# Patient Record
Sex: MALE | Race: WHITE | URBAN - METROPOLITAN AREA
[De-identification: names, ages, dates, MRNs, and addresses within clinical notes are randomized per-mention and may not be internally consistent; named-entity substitution may affect disease eponyms.]

---

## 2020-06-24 ENCOUNTER — OFFICE VISIT (OUTPATIENT)
Dept: URBAN - METROPOLITAN AREA CLINIC 71 | Facility: CLINIC | Age: 80
End: 2020-06-24
Payer: MEDICARE

## 2020-06-24 DIAGNOSIS — H04.123 DRY EYE SYNDROME OF BILATERAL LACRIMAL GLANDS: ICD-10-CM

## 2020-06-24 PROCEDURE — 92134 CPTRZ OPH DX IMG PST SGM RTA: CPT | Performed by: OPHTHALMOLOGY

## 2020-06-24 PROCEDURE — 99203 OFFICE O/P NEW LOW 30 MIN: CPT | Performed by: OPHTHALMOLOGY

## 2020-06-24 ASSESSMENT — INTRAOCULAR PRESSURE
OD: 11
OS: 12

## 2020-06-24 ASSESSMENT — KERATOMETRY
OS: 42.50
OD: 42.50

## 2020-06-24 NOTE — IMPRESSION/PLAN
Impression: Combined forms of age-related cataract, bilateral: H25.813. OD worse than OS. Plan: Cataracts do not require treatment unless they interfere with vision and impact one's activities of daily living, in which case cataract extraction with lens implant insertion should be performed. Cataracts occur in everyone as they age. Contact office if you experience progressive loss of vision, increasing glare, and problems with daily activities. No recommendation for cataract surgery until patient has more visual complaints. Patient to call if changes in vision occur. Will monitor annually for changes.

## 2021-05-12 ENCOUNTER — OFFICE VISIT (OUTPATIENT)
Dept: URBAN - METROPOLITAN AREA CLINIC 71 | Facility: CLINIC | Age: 81
End: 2021-05-12
Payer: MEDICARE

## 2021-05-12 DIAGNOSIS — H35.363 DRUSEN (DEGENERATIVE) OF MACULA, BILATERAL: ICD-10-CM

## 2021-05-12 PROCEDURE — 92014 COMPRE OPH EXAM EST PT 1/>: CPT | Performed by: OPHTHALMOLOGY

## 2021-05-12 PROCEDURE — 92134 CPTRZ OPH DX IMG PST SGM RTA: CPT | Performed by: OPHTHALMOLOGY

## 2021-05-12 ASSESSMENT — VISUAL ACUITY
OS: 20/40
OD: 20/40

## 2021-05-12 ASSESSMENT — INTRAOCULAR PRESSURE
OS: 10
OD: 9

## 2021-05-12 NOTE — IMPRESSION/PLAN
Impression: Combined forms of age-related cataract, bilateral: H25.813. Worsening. Plan: Discussed. Recommend cataract surgery OU, glasses will not likely improve vision significantly at this point. Pt elects to proceed.  Will schedule for Cataract surgery OU

## 2021-05-12 NOTE — IMPRESSION/PLAN
Impression: Drusen (degenerative) of macula, bilateral: H35.363. Plan: Discussed. Will continue to monitor.  Contact office with any changes in vision

## 2021-07-06 ENCOUNTER — PRE-OPERATIVE VISIT (OUTPATIENT)
Dept: URBAN - METROPOLITAN AREA CLINIC 71 | Facility: CLINIC | Age: 81
End: 2021-07-06
Payer: MEDICARE

## 2021-07-06 DIAGNOSIS — H25.813 COMBINED FORMS OF AGE-RELATED CATARACT, BILATERAL: Primary | ICD-10-CM

## 2021-07-06 DIAGNOSIS — H25.812 COMBINED FORMS OF AGE-RELATED CATARACT, LEFT EYE: ICD-10-CM

## 2021-07-06 PROCEDURE — 92136 OPHTHALMIC BIOMETRY: CPT | Performed by: OPHTHALMOLOGY

## 2021-07-06 PROCEDURE — 92012 INTRM OPH EXAM EST PATIENT: CPT | Performed by: OPHTHALMOLOGY

## 2021-07-06 ASSESSMENT — PACHYMETRY
OS: 3.41
OD: 3.37
OD: 24.94
OS: 24.77

## 2021-07-29 NOTE — IMPRESSION/PLAN
Impression: Combined forms of age-related cataract, bilateral: H25.813. Plan: Discussed diagnosis in detail with patient. Discussed risks and benefits and patient understands. Discussed treatment options with patient. Surgical treatment is necessary to improve vision. Surgical risks and benefits were discussed, explained and understood by patient. Patient elects to have surgery. Pre op instructions given and understood. Lid scrubs and hygiene were explained. Patient instructed to use artificial tears as needed. Post op instructions given and understood. Continue using current medication(s). PROCEED WITH CARE PLUS CATARACT SURGERY WITH ORA, OD FIRST, DISTANCE, THEN OS, DISTANCE, DEXYCU, CS DONE.

## 2021-08-09 ENCOUNTER — SURGERY (OUTPATIENT)
Dept: URBAN - METROPOLITAN AREA SURGERY 44 | Facility: SURGERY | Age: 81
End: 2021-08-09
Payer: MEDICARE

## 2021-08-09 ENCOUNTER — SURGERY (OUTPATIENT)
Dept: URBAN - METROPOLITAN AREA SURGERY 45 | Facility: SURGERY | Age: 81
End: 2021-08-09
Payer: MEDICARE

## 2021-08-09 PROCEDURE — 66984 XCAPSL CTRC RMVL W/O ECP: CPT | Performed by: OPHTHALMOLOGY

## 2021-08-09 PROCEDURE — G8918 PT W/O PREOP ORDER IV AB PRO: HCPCS | Performed by: CLINIC/CENTER

## 2021-08-09 PROCEDURE — G8907 PT DOC NO EVENTS ON DISCHARG: HCPCS | Performed by: CLINIC/CENTER

## 2021-08-10 ENCOUNTER — POST-OPERATIVE VISIT (OUTPATIENT)
Dept: URBAN - METROPOLITAN AREA CLINIC 72 | Facility: CLINIC | Age: 81
End: 2021-08-10
Payer: MEDICARE

## 2021-08-10 DIAGNOSIS — Z48.810 ENCOUNTER FOR SURGICAL AFTERCARE FOLLOWING SURGERY ON A SENSE ORGAN: Primary | ICD-10-CM

## 2021-08-10 PROCEDURE — 99024 POSTOP FOLLOW-UP VISIT: CPT | Performed by: OPTOMETRIST

## 2021-08-10 ASSESSMENT — INTRAOCULAR PRESSURE
OS: 12
OD: 10

## 2021-08-10 NOTE — IMPRESSION/PLAN
Impression: S/P Cataract Extraction by phacoemulsification with IOL placement; ORA OD - 1 Day. Encounter for surgical aftercare following surgery on a sense organ  Z48.810. Excellent post op course   Post operative instructions reviewed - Condition is improving Ok to proceed with surgery - Cataract OS. Plan: Discussed R/B/A to surgery, pt voices understanding and wishes to proceed. --Advised patient to use artificial tears for comfort.

## 2021-08-19 ENCOUNTER — POST-OPERATIVE VISIT (OUTPATIENT)
Dept: URBAN - METROPOLITAN AREA CLINIC 71 | Facility: CLINIC | Age: 81
End: 2021-08-19
Payer: MEDICARE

## 2021-08-19 PROCEDURE — 99024 POSTOP FOLLOW-UP VISIT: CPT | Performed by: OPHTHALMOLOGY

## 2021-08-19 ASSESSMENT — INTRAOCULAR PRESSURE
OD: 9
OS: 8

## 2021-08-19 NOTE — IMPRESSION/PLAN
Impression:  Encounter for surgical aftercare following surgery on a sense organ  Z48.810. Excellent post op course   Post operative instructions reviewed - FB sensation RUL - lid everted, no FB found. Plan: Discussed Begin Pred TID OD for 4 weeks. Ok to continue with surgery 2nd eye. Pt to contact office if any issues or if wanting to postpone surgery.

## 2021-08-23 ENCOUNTER — SURGERY (OUTPATIENT)
Dept: URBAN - METROPOLITAN AREA SURGERY 45 | Facility: SURGERY | Age: 81
End: 2021-08-23
Payer: MEDICARE

## 2021-08-23 ENCOUNTER — SURGERY (OUTPATIENT)
Dept: URBAN - METROPOLITAN AREA SURGERY 44 | Facility: SURGERY | Age: 81
End: 2021-08-23
Payer: MEDICARE

## 2021-08-23 PROCEDURE — G8907 PT DOC NO EVENTS ON DISCHARG: HCPCS | Performed by: CLINIC/CENTER

## 2021-08-23 PROCEDURE — 66984 XCAPSL CTRC RMVL W/O ECP: CPT | Performed by: OPHTHALMOLOGY

## 2021-08-23 PROCEDURE — G8918 PT W/O PREOP ORDER IV AB PRO: HCPCS | Performed by: CLINIC/CENTER

## 2021-08-24 ENCOUNTER — POST-OPERATIVE VISIT (OUTPATIENT)
Dept: URBAN - METROPOLITAN AREA CLINIC 72 | Facility: CLINIC | Age: 81
End: 2021-08-24
Payer: MEDICARE

## 2021-08-24 PROCEDURE — 99024 POSTOP FOLLOW-UP VISIT: CPT | Performed by: OPTOMETRIST

## 2021-08-24 ASSESSMENT — INTRAOCULAR PRESSURE
OD: 10
OS: 10

## 2021-08-24 NOTE — IMPRESSION/PLAN
Impression: S/P Cataract Extraction by phacoemulsification with IOL placement; ORA OS - 1 Day. Presence of intraocular lens  Z96.1. Excellent post op course   Post operative instructions reviewed - Condition is improving - Plan: Advised patient to use artificial tears for comfort. --   OD--Taper Prednisolone acetate 1% TID x 1 wk, BID x 1wk, QD x 1wk, then d/c OD--Taper Prednisolone acetate 1% QID x 1 wk, TID x 1 wk, BID x 1wk, QD x 1wk, then d/c OS

## 2021-09-13 ENCOUNTER — POST-OPERATIVE VISIT (OUTPATIENT)
Dept: URBAN - METROPOLITAN AREA CLINIC 72 | Facility: CLINIC | Age: 81
End: 2021-09-13
Payer: MEDICARE

## 2021-09-13 PROCEDURE — 99024 POSTOP FOLLOW-UP VISIT: CPT | Performed by: OPTOMETRIST

## 2021-09-13 RX ORDER — PREDNISOLONE ACETATE 10 MG/ML
1 % SUSPENSION/ DROPS OPHTHALMIC
Qty: 5 | Refills: 0 | Status: INACTIVE
Start: 2021-09-13 | End: 2022-02-04

## 2021-09-13 ASSESSMENT — INTRAOCULAR PRESSURE
OS: 10
OD: 8

## 2021-09-13 NOTE — IMPRESSION/PLAN
Impression: S/P Cataract Extraction by phacoemulsification with IOL placement; ORA OS - 21 Days. Presence of intraocular lens  Z96.1.  Post operative instructions reviewed - Plan:

## 2021-09-20 ENCOUNTER — POST-OPERATIVE VISIT (OUTPATIENT)
Dept: URBAN - METROPOLITAN AREA CLINIC 72 | Facility: CLINIC | Age: 81
End: 2021-09-20
Payer: MEDICARE

## 2021-09-20 DIAGNOSIS — Z96.1 PRESENCE OF INTRAOCULAR LENS: Primary | ICD-10-CM

## 2021-09-20 PROCEDURE — 99024 POSTOP FOLLOW-UP VISIT: CPT | Performed by: OPTOMETRIST

## 2021-09-20 ASSESSMENT — INTRAOCULAR PRESSURE
OS: 13
OD: 12

## 2021-09-20 ASSESSMENT — VISUAL ACUITY
OS: 20/20
OD: 20/20

## 2021-09-20 NOTE — IMPRESSION/PLAN
Impression: S/P Cataract Extraction by phacoemulsification with IOL placement; ORA OS - 28 Days. Presence of intraocular lens  Z96.1. Excellent post op course   Post operative instructions reviewed - Condition is improving - Plan: --Taper Prednisolone acetate 1% BID x 1wk, QD x 1wk, then d/c--Advised patient to use artificial tears for comfort.

## 2022-02-04 ENCOUNTER — OFFICE VISIT (OUTPATIENT)
Dept: URBAN - METROPOLITAN AREA CLINIC 72 | Facility: CLINIC | Age: 82
End: 2022-02-04
Payer: MEDICARE

## 2022-02-04 DIAGNOSIS — L53.9 ERYTHEMA: Primary | ICD-10-CM

## 2022-02-04 PROCEDURE — 99212 OFFICE O/P EST SF 10 MIN: CPT | Performed by: OPTOMETRIST

## 2022-02-04 RX ORDER — NEOMYCIN, POLYMYXIN B SULFATES, DEXAMETHASONE 1; 3.5; 1 MG/G; MG/G; [USP'U]/G
OINTMENT OPHTHALMIC
Qty: 3.5 | Refills: 0 | Status: INACTIVE
Start: 2022-02-04 | End: 2022-03-29

## 2022-02-04 ASSESSMENT — INTRAOCULAR PRESSURE
OS: 12
OD: 11

## 2022-02-04 NOTE — IMPRESSION/PLAN
Impression: Erythema: L53.9. of RUL. Impacted eyelash found RUL Plan: Discussed condition with Pt. Begin Neopolydex anup tid OD. Call if condition worsens.

## 2022-03-29 ENCOUNTER — OFFICE VISIT (OUTPATIENT)
Dept: URBAN - METROPOLITAN AREA CLINIC 72 | Facility: CLINIC | Age: 82
End: 2022-03-29
Payer: MEDICARE

## 2022-03-29 DIAGNOSIS — H43.813 VITREOUS DEGENERATION, BILATERAL: Primary | ICD-10-CM

## 2022-03-29 DIAGNOSIS — H26.493 OTHER SECONDARY CATARACT, BILATERAL: ICD-10-CM

## 2022-03-29 PROCEDURE — 99213 OFFICE O/P EST LOW 20 MIN: CPT | Performed by: OPTOMETRIST

## 2022-03-29 ASSESSMENT — INTRAOCULAR PRESSURE
OS: 12
OD: 13

## 2023-04-04 ENCOUNTER — OFFICE VISIT (OUTPATIENT)
Dept: URBAN - METROPOLITAN AREA CLINIC 71 | Facility: CLINIC | Age: 83
End: 2023-04-04
Payer: MEDICARE

## 2023-04-04 DIAGNOSIS — H40.013 OPEN ANGLE WITH BORDERLINE FINDINGS, LOW RISK, BILATERAL: Primary | ICD-10-CM

## 2023-04-04 DIAGNOSIS — Z96.1 PRESENCE OF INTRAOCULAR LENS: ICD-10-CM

## 2023-04-04 DIAGNOSIS — H43.813 VITREOUS DEGENERATION, BILATERAL: ICD-10-CM

## 2023-04-04 PROCEDURE — 92134 CPTRZ OPH DX IMG PST SGM RTA: CPT | Performed by: OPHTHALMOLOGY

## 2023-04-04 PROCEDURE — 99213 OFFICE O/P EST LOW 20 MIN: CPT | Performed by: OPHTHALMOLOGY

## 2023-04-04 ASSESSMENT — KERATOMETRY
OD: 43.00
OS: 42.00

## 2023-04-04 ASSESSMENT — INTRAOCULAR PRESSURE
OS: 10
OD: 9

## 2023-04-04 NOTE — IMPRESSION/PLAN
Impression: Open angle with borderline findings, low risk, bilateral: H40.013. OCT ordered and reviewed today. Healthy RNFL OU. Normotensive IOP OU. Glaucoma suspect due to cupping. Plan: No treatment needed at this time. Discussed this is likely normal for him but we will monitor annually with a VF next year.

## 2023-04-04 NOTE — IMPRESSION/PLAN
Impression: Vitreous degeneration, bilateral: H43.813. OCT ordered and reviewed today. No holes or tears noted. Plan: Discussed signs and symptoms of retinal tear/detachment. Pt to RTC PRN with any change to visual status.

## 2024-01-08 ENCOUNTER — REFRACTIVE (OUTPATIENT)
Dept: URBAN - METROPOLITAN AREA CLINIC 72 | Facility: CLINIC | Age: 84
End: 2024-01-08
Payer: COMMERCIAL

## 2024-01-08 DIAGNOSIS — H52.4 PRESBYOPIA: Primary | ICD-10-CM

## 2024-01-08 ASSESSMENT — VISUAL ACUITY
OS: 20/20
OD: 20/20

## 2024-04-08 ENCOUNTER — OFFICE VISIT (OUTPATIENT)
Dept: URBAN - METROPOLITAN AREA CLINIC 71 | Facility: CLINIC | Age: 84
End: 2024-04-08
Payer: MEDICARE

## 2024-04-08 DIAGNOSIS — H43.813 VITREOUS DEGENERATION, BILATERAL: ICD-10-CM

## 2024-04-08 DIAGNOSIS — H04.123 DRY EYE SYNDROME OF BILATERAL LACRIMAL GLANDS: ICD-10-CM

## 2024-04-08 DIAGNOSIS — Z96.1 PRESENCE OF INTRAOCULAR LENS: ICD-10-CM

## 2024-04-08 DIAGNOSIS — H40.013 OPEN ANGLE WITH BORDERLINE FINDINGS, LOW RISK, BILATERAL: Primary | ICD-10-CM

## 2024-04-08 PROCEDURE — 99213 OFFICE O/P EST LOW 20 MIN: CPT | Performed by: OPHTHALMOLOGY

## 2024-04-08 PROCEDURE — 92133 CPTRZD OPH DX IMG PST SGM ON: CPT | Performed by: OPHTHALMOLOGY

## 2024-04-08 PROCEDURE — 92134 CPTRZ OPH DX IMG PST SGM RTA: CPT | Performed by: OPHTHALMOLOGY

## 2024-04-08 ASSESSMENT — INTRAOCULAR PRESSURE
OS: 13
OD: 11
OD: 12
OS: 12

## 2024-09-25 ENCOUNTER — OFFICE VISIT (OUTPATIENT)
Dept: URBAN - METROPOLITAN AREA CLINIC 71 | Facility: CLINIC | Age: 84
End: 2024-09-25
Payer: MEDICARE

## 2024-09-25 DIAGNOSIS — Z96.1 PRESENCE OF INTRAOCULAR LENS: ICD-10-CM

## 2024-09-25 DIAGNOSIS — H04.123 DRY EYE SYNDROME OF BILATERAL LACRIMAL GLANDS: ICD-10-CM

## 2024-09-25 DIAGNOSIS — H40.013 OPEN ANGLE WITH BORDERLINE FINDINGS, LOW RISK, BILATERAL: Primary | ICD-10-CM

## 2024-09-25 PROCEDURE — 99212 OFFICE O/P EST SF 10 MIN: CPT | Performed by: OPHTHALMOLOGY

## 2024-09-25 ASSESSMENT — INTRAOCULAR PRESSURE
OS: 10
OD: 9
OD: 13
OS: 13

## 2025-03-26 ENCOUNTER — OFFICE VISIT (OUTPATIENT)
Dept: URBAN - METROPOLITAN AREA CLINIC 71 | Facility: CLINIC | Age: 85
End: 2025-03-26
Payer: MEDICARE

## 2025-03-26 DIAGNOSIS — H40.013 OPEN ANGLE WITH BORDERLINE FINDINGS, LOW RISK, BILATERAL: Primary | ICD-10-CM

## 2025-03-26 DIAGNOSIS — H43.813 VITREOUS DEGENERATION, BILATERAL: ICD-10-CM

## 2025-03-26 DIAGNOSIS — Z96.1 PRESENCE OF INTRAOCULAR LENS: ICD-10-CM

## 2025-03-26 PROCEDURE — 92133 CPTRZD OPH DX IMG PST SGM ON: CPT | Performed by: OPHTHALMOLOGY

## 2025-03-26 PROCEDURE — 92134 CPTRZ OPH DX IMG PST SGM RTA: CPT | Performed by: OPHTHALMOLOGY

## 2025-03-26 PROCEDURE — 99213 OFFICE O/P EST LOW 20 MIN: CPT | Performed by: OPHTHALMOLOGY

## 2025-03-26 ASSESSMENT — INTRAOCULAR PRESSURE
OS: 11
OD: 8
OS: 14
OD: 12

## 2025-08-04 ENCOUNTER — OFFICE VISIT (OUTPATIENT)
Dept: URBAN - METROPOLITAN AREA CLINIC 71 | Facility: CLINIC | Age: 85
End: 2025-08-04
Payer: MEDICARE

## 2025-08-04 DIAGNOSIS — H43.813 VITREOUS DEGENERATION, BILATERAL: ICD-10-CM

## 2025-08-04 DIAGNOSIS — Z96.1 PRESENCE OF INTRAOCULAR LENS: ICD-10-CM

## 2025-08-04 DIAGNOSIS — H04.123 DRY EYE SYNDROME OF BILATERAL LACRIMAL GLANDS: ICD-10-CM

## 2025-08-04 DIAGNOSIS — H40.013 OPEN ANGLE WITH BORDERLINE FINDINGS, LOW RISK, BILATERAL: ICD-10-CM

## 2025-08-04 DIAGNOSIS — H26.493 OTHER SECONDARY CATARACT, BILATERAL: Primary | ICD-10-CM

## 2025-08-04 PROCEDURE — 99214 OFFICE O/P EST MOD 30 MIN: CPT

## 2025-08-04 PROCEDURE — 92134 CPTRZ OPH DX IMG PST SGM RTA: CPT

## 2025-08-04 PROCEDURE — 92133 CPTRZD OPH DX IMG PST SGM ON: CPT

## 2025-08-04 ASSESSMENT — INTRAOCULAR PRESSURE
OD: 8
OS: 8

## 2025-08-04 ASSESSMENT — VISUAL ACUITY
OD: 20/40
OS: 20/20

## 2025-08-12 ENCOUNTER — SURGERY (OUTPATIENT)
Dept: URBAN - METROPOLITAN AREA SURGERY 44 | Facility: SURGERY | Age: 85
End: 2025-08-12
Payer: MEDICARE

## 2025-08-12 ENCOUNTER — SURGERY (OUTPATIENT)
Dept: URBAN - METROPOLITAN AREA SURGERY 45 | Facility: SURGERY | Age: 85
End: 2025-08-12
Payer: MEDICARE

## 2025-08-12 PROCEDURE — 66821 AFTER CATARACT LASER SURGERY: CPT | Performed by: OPHTHALMOLOGY

## 2025-08-19 ENCOUNTER — POST-OPERATIVE VISIT (OUTPATIENT)
Dept: URBAN - METROPOLITAN AREA CLINIC 71 | Facility: CLINIC | Age: 85
End: 2025-08-19
Payer: MEDICARE

## 2025-08-19 DIAGNOSIS — Z48.810 ENCOUNTER FOR SURGICAL AFTERCARE FOLLOWING SURGERY ON A SENSE ORGAN: Primary | ICD-10-CM

## 2025-08-19 PROCEDURE — 99024 POSTOP FOLLOW-UP VISIT: CPT

## 2025-08-19 ASSESSMENT — INTRAOCULAR PRESSURE
OS: 8
OD: 6